# Patient Record
Sex: FEMALE | Race: WHITE | Employment: FULL TIME | URBAN - METROPOLITAN AREA
[De-identification: names, ages, dates, MRNs, and addresses within clinical notes are randomized per-mention and may not be internally consistent; named-entity substitution may affect disease eponyms.]

---

## 2017-05-24 ENCOUNTER — HOSPITAL ENCOUNTER (EMERGENCY)
Age: 27
Discharge: HOME | End: 2017-05-24
Attending: EMERGENCY MEDICINE

## 2017-05-24 ENCOUNTER — APPOINTMENT (OUTPATIENT)
Dept: CT IMAGING | Age: 27
End: 2017-05-24

## 2017-05-24 VITALS
WEIGHT: 145 LBS | TEMPERATURE: 99.1 F | SYSTOLIC BLOOD PRESSURE: 117 MMHG | BODY MASS INDEX: 24.75 KG/M2 | HEIGHT: 64 IN | OXYGEN SATURATION: 100 % | RESPIRATION RATE: 16 BRPM | HEART RATE: 100 BPM | DIASTOLIC BLOOD PRESSURE: 72 MMHG

## 2017-05-24 DIAGNOSIS — K59.00 CONSTIPATION, UNSPECIFIED CONSTIPATION TYPE: ICD-10-CM

## 2017-05-24 DIAGNOSIS — R10.11 RIGHT UPPER QUADRANT ABDOMINAL PAIN: Primary | ICD-10-CM

## 2017-05-24 LAB
-: ABNORMAL
ABSOLUTE EOS #: 0.12 K/UL (ref 0–0.4)
ABSOLUTE LYMPH #: 3.38 K/UL (ref 1–4.8)
ABSOLUTE MONO #: 0.65 K/UL (ref 0.1–1.2)
ALBUMIN SERPL-MCNC: 4.3 G/DL (ref 3.5–5.2)
ALBUMIN/GLOBULIN RATIO: 1.7 (ref 1–2.5)
ALP BLD-CCNC: 47 U/L (ref 35–104)
ALT SERPL-CCNC: 10 U/L (ref 5–33)
AMORPHOUS: ABNORMAL
ANION GAP SERPL CALCULATED.3IONS-SCNC: 15 MMOL/L (ref 9–17)
AST SERPL-CCNC: 16 U/L
BACTERIA: ABNORMAL
BASOPHILS # BLD: 0 %
BASOPHILS ABSOLUTE: 0.03 K/UL (ref 0–0.2)
BILIRUB SERPL-MCNC: 0.24 MG/DL (ref 0.3–1.2)
BILIRUBIN URINE: NEGATIVE
BUN BLDV-MCNC: 16 MG/DL (ref 6–20)
BUN/CREAT BLD: ABNORMAL (ref 9–20)
CALCIUM SERPL-MCNC: 9.3 MG/DL (ref 8.6–10.4)
CASTS UA: ABNORMAL /LPF
CHLORIDE BLD-SCNC: 102 MMOL/L (ref 98–107)
CO2: 24 MMOL/L (ref 20–31)
COLOR: YELLOW
COMMENT UA: ABNORMAL
CREAT SERPL-MCNC: 0.9 MG/DL (ref 0.5–0.9)
CRYSTALS, UA: ABNORMAL /HPF
DIFFERENTIAL TYPE: ABNORMAL
EOSINOPHILS RELATIVE PERCENT: 2 %
EPITHELIAL CELLS UA: ABNORMAL /HPF (ref 0–5)
GFR AFRICAN AMERICAN: >60 ML/MIN
GFR NON-AFRICAN AMERICAN: >60 ML/MIN
GFR SERPL CREATININE-BSD FRML MDRD: ABNORMAL ML/MIN/{1.73_M2}
GFR SERPL CREATININE-BSD FRML MDRD: ABNORMAL ML/MIN/{1.73_M2}
GLUCOSE BLD-MCNC: 77 MG/DL (ref 70–99)
GLUCOSE URINE: NEGATIVE
HCG(URINE) PREGNANCY TEST: NEGATIVE
HCT VFR BLD CALC: 36.9 % (ref 36–46)
HEMOGLOBIN: 12.3 G/DL (ref 12–16)
KETONES, URINE: NEGATIVE
LEUKOCYTE ESTERASE, URINE: NEGATIVE
LIPASE: 12 U/L (ref 13–60)
LYMPHOCYTES # BLD: 45 %
MCH RBC QN AUTO: 31.6 PG (ref 26–34)
MCHC RBC AUTO-ENTMCNC: 33.3 G/DL (ref 31–37)
MCV RBC AUTO: 94.9 FL (ref 80–100)
MONOCYTES # BLD: 9 %
MUCUS: ABNORMAL
NITRITE, URINE: NEGATIVE
OTHER OBSERVATIONS UA: ABNORMAL
PDW BLD-RTO: 12.5 % (ref 12.5–15.4)
PH UA: 7.5 (ref 5–8)
PLATELET # BLD: 193 K/UL (ref 140–450)
PLATELET ESTIMATE: ABNORMAL
PMV BLD AUTO: 10.1 FL (ref 8–14)
POTASSIUM SERPL-SCNC: 4.1 MMOL/L (ref 3.7–5.3)
PROTEIN UA: NEGATIVE
RBC # BLD: 3.89 M/UL (ref 4–5.2)
RBC # BLD: ABNORMAL 10*6/UL
RBC UA: ABNORMAL /HPF (ref 0–2)
RENAL EPITHELIAL, UA: ABNORMAL /HPF
SEG NEUTROPHILS: 44 %
SEGMENTED NEUTROPHILS ABSOLUTE COUNT: 3.35 K/UL (ref 1.8–7.7)
SODIUM BLD-SCNC: 141 MMOL/L (ref 135–144)
SPECIFIC GRAVITY UA: 1.01 (ref 1–1.03)
TOTAL PROTEIN: 6.9 G/DL (ref 6.4–8.3)
TRICHOMONAS: ABNORMAL
TURBIDITY: CLEAR
URINE HGB: ABNORMAL
UROBILINOGEN, URINE: NORMAL
WBC # BLD: 7.5 K/UL (ref 3.5–11)
WBC # BLD: ABNORMAL 10*3/UL
WBC UA: ABNORMAL /HPF (ref 0–5)
YEAST: ABNORMAL

## 2017-05-24 PROCEDURE — 6370000000 HC RX 637 (ALT 250 FOR IP): Performed by: EMERGENCY MEDICINE

## 2017-05-24 PROCEDURE — 84703 CHORIONIC GONADOTROPIN ASSAY: CPT

## 2017-05-24 PROCEDURE — 85025 COMPLETE CBC W/AUTO DIFF WBC: CPT

## 2017-05-24 PROCEDURE — 36415 COLL VENOUS BLD VENIPUNCTURE: CPT

## 2017-05-24 PROCEDURE — 83690 ASSAY OF LIPASE: CPT

## 2017-05-24 PROCEDURE — 96376 TX/PRO/DX INJ SAME DRUG ADON: CPT

## 2017-05-24 PROCEDURE — 6360000002 HC RX W HCPCS: Performed by: EMERGENCY MEDICINE

## 2017-05-24 PROCEDURE — 2580000003 HC RX 258: Performed by: EMERGENCY MEDICINE

## 2017-05-24 PROCEDURE — 96374 THER/PROPH/DIAG INJ IV PUSH: CPT

## 2017-05-24 PROCEDURE — 99284 EMERGENCY DEPT VISIT MOD MDM: CPT

## 2017-05-24 PROCEDURE — 6360000004 HC RX CONTRAST MEDICATION: Performed by: EMERGENCY MEDICINE

## 2017-05-24 PROCEDURE — 81001 URINALYSIS AUTO W/SCOPE: CPT

## 2017-05-24 PROCEDURE — 74177 CT ABD & PELVIS W/CONTRAST: CPT

## 2017-05-24 PROCEDURE — 80053 COMPREHEN METABOLIC PANEL: CPT

## 2017-05-24 RX ORDER — ARIPIPRAZOLE 2 MG/1
2 TABLET ORAL DAILY
COMMUNITY

## 2017-05-24 RX ORDER — SODIUM CHLORIDE 0.9 % (FLUSH) 0.9 %
10 SYRINGE (ML) INJECTION PRN
Status: DISCONTINUED | OUTPATIENT
Start: 2017-05-24 | End: 2017-05-24 | Stop reason: HOSPADM

## 2017-05-24 RX ORDER — NORETHINDRONE ACETATE AND ETHINYL ESTRADIOL 1.5-30(21)
1 KIT ORAL
COMMUNITY
Start: 2016-12-15 | End: 2018-10-16

## 2017-05-24 RX ORDER — 0.9 % SODIUM CHLORIDE 0.9 %
100 INTRAVENOUS SOLUTION INTRAVENOUS ONCE
Status: COMPLETED | OUTPATIENT
Start: 2017-05-24 | End: 2017-05-24

## 2017-05-24 RX ORDER — MORPHINE SULFATE 4 MG/ML
4 INJECTION, SOLUTION INTRAMUSCULAR; INTRAVENOUS ONCE
Status: COMPLETED | OUTPATIENT
Start: 2017-05-24 | End: 2017-05-24

## 2017-05-24 RX ORDER — MORPHINE SULFATE 2 MG/ML
2 INJECTION, SOLUTION INTRAMUSCULAR; INTRAVENOUS ONCE
Status: COMPLETED | OUTPATIENT
Start: 2017-05-24 | End: 2017-05-24

## 2017-05-24 RX ORDER — KETOROLAC TROMETHAMINE 30 MG/ML
30 INJECTION, SOLUTION INTRAMUSCULAR; INTRAVENOUS ONCE
Status: DISCONTINUED | OUTPATIENT
Start: 2017-05-24 | End: 2017-05-24 | Stop reason: HOSPADM

## 2017-05-24 RX ADMIN — MAGESIUM CITRATE 296 ML: 1.75 LIQUID ORAL at 03:10

## 2017-05-24 RX ADMIN — IOVERSOL 130 ML: 741 INJECTION INTRA-ARTERIAL; INTRAVENOUS at 02:21

## 2017-05-24 RX ADMIN — SODIUM CHLORIDE 100 ML: 9 INJECTION, SOLUTION INTRAVENOUS at 02:21

## 2017-05-24 RX ADMIN — Medication 10 ML: at 02:21

## 2017-05-24 RX ADMIN — MORPHINE SULFATE 4 MG: 4 INJECTION, SOLUTION INTRAMUSCULAR; INTRAVENOUS at 02:59

## 2017-05-24 RX ADMIN — MORPHINE SULFATE 2 MG: 2 INJECTION, SOLUTION INTRAMUSCULAR; INTRAVENOUS at 01:46

## 2017-05-24 ASSESSMENT — PAIN SCALES - GENERAL
PAINLEVEL_OUTOF10: 7
PAINLEVEL_OUTOF10: 7

## 2017-05-24 ASSESSMENT — ENCOUNTER SYMPTOMS
SHORTNESS OF BREATH: 0
ABDOMINAL PAIN: 1
DIARRHEA: 0
EYE PAIN: 0
BACK PAIN: 0
SORE THROAT: 0
NAUSEA: 0
CONSTIPATION: 0
WHEEZING: 0
COLOR CHANGE: 0
EYE DISCHARGE: 0
CHEST TIGHTNESS: 0
RHINORRHEA: 0
COUGH: 0
EYE REDNESS: 0

## 2017-05-24 ASSESSMENT — PAIN DESCRIPTION - DESCRIPTORS
DESCRIPTORS: SHARP;DULL;ACHING
DESCRIPTORS: DULL;ACHING

## 2017-05-24 ASSESSMENT — PAIN DESCRIPTION - LOCATION
LOCATION: ABDOMEN
LOCATION: ABDOMEN

## 2017-05-24 ASSESSMENT — PAIN DESCRIPTION - PAIN TYPE: TYPE: ACUTE PAIN

## 2017-05-24 ASSESSMENT — PAIN DESCRIPTION - PROGRESSION: CLINICAL_PROGRESSION: NOT CHANGED

## 2017-05-24 ASSESSMENT — PAIN DESCRIPTION - ORIENTATION
ORIENTATION: RIGHT;LOWER
ORIENTATION: RIGHT;LOWER

## 2017-06-09 ENCOUNTER — HOSPITAL ENCOUNTER (OUTPATIENT)
Age: 27
Discharge: HOME | End: 2017-06-09
Payer: COMMERCIAL

## 2017-06-10 ENCOUNTER — HOSPITAL ENCOUNTER (OUTPATIENT)
Age: 27
Setting detail: SPECIMEN
Discharge: HOME | End: 2017-06-10
Payer: COMMERCIAL

## 2017-06-10 PROCEDURE — 82533 TOTAL CORTISOL: CPT

## 2017-06-14 LAB — CORTISOL SALIVARY: 0.07 UG/DL

## 2018-10-16 ENCOUNTER — OFFICE VISIT (OUTPATIENT)
Dept: FAMILY MEDICINE CLINIC | Age: 28
End: 2018-10-16
Payer: COMMERCIAL

## 2018-10-16 VITALS
RESPIRATION RATE: 16 BRPM | DIASTOLIC BLOOD PRESSURE: 80 MMHG | HEIGHT: 64 IN | WEIGHT: 132 LBS | HEART RATE: 108 BPM | SYSTOLIC BLOOD PRESSURE: 112 MMHG | BODY MASS INDEX: 22.53 KG/M2

## 2018-10-16 DIAGNOSIS — R00.0 TACHYCARDIA: Primary | ICD-10-CM

## 2018-10-16 DIAGNOSIS — F41.9 ANXIETY: ICD-10-CM

## 2018-10-16 PROBLEM — E66.3 SEVERELY OVERWEIGHT: Status: ACTIVE | Noted: 2017-06-23

## 2018-10-16 PROBLEM — G93.32 CHRONIC FATIGUE SYNDROME: Status: ACTIVE | Noted: 2017-06-23

## 2018-10-16 PROBLEM — G47.09: Status: ACTIVE | Noted: 2017-06-23

## 2018-10-16 PROBLEM — N92.6 IRREGULAR MENSTRUAL CYCLE: Status: ACTIVE | Noted: 2017-06-23

## 2018-10-16 PROBLEM — K56.7 ILEUS (HCC): Status: ACTIVE | Noted: 2018-10-16

## 2018-10-16 PROCEDURE — 99204 OFFICE O/P NEW MOD 45 MIN: CPT | Performed by: FAMILY MEDICINE

## 2018-10-16 RX ORDER — NORETHINDRONE ACETATE AND ETHINYL ESTRADIOL AND FERROUS FUMARATE 1MG-20(21)
KIT ORAL
COMMUNITY
Start: 2018-09-23

## 2018-10-16 RX ORDER — CLONAZEPAM 1 MG/1
TABLET ORAL
COMMUNITY
Start: 2018-10-09 | End: 2018-10-24 | Stop reason: ALTCHOICE

## 2018-10-16 RX ORDER — CLONIDINE HYDROCHLORIDE 0.2 MG/1
TABLET ORAL
COMMUNITY
Start: 2018-10-07

## 2018-10-16 RX ORDER — ALPRAZOLAM 0.25 MG/1
TABLET ORAL
COMMUNITY
Start: 2018-10-15 | End: 2018-10-24 | Stop reason: ALTCHOICE

## 2018-10-16 ASSESSMENT — PATIENT HEALTH QUESTIONNAIRE - PHQ9
SUM OF ALL RESPONSES TO PHQ9 QUESTIONS 1 & 2: 0
2. FEELING DOWN, DEPRESSED OR HOPELESS: 0
SUM OF ALL RESPONSES TO PHQ QUESTIONS 1-9: 0
1. LITTLE INTEREST OR PLEASURE IN DOING THINGS: 0
SUM OF ALL RESPONSES TO PHQ QUESTIONS 1-9: 0

## 2018-10-16 NOTE — PROGRESS NOTES
Hematological: Negative. Negative for adenopathy. Does not bruise/bleed easily. Psychiatric/Behavioral: Negative for sleep disturbance, dysphoric mood and  decreased concentration. The patient is not nervous/anxious. Objective:     Physical Exam:     Nursing note and vitals reviewed. /80   Pulse 108   Resp 16   Ht 5' 4\" (1.626 m)   Wt 132 lb (59.9 kg)   Breastfeeding? No   BMI 22.66 kg/m²   Constitutional: She is oriented to person, place, and time. She   appears well-developed and well-nourished. HENT:   Head: Normocephalic and atraumatic. Right Ear: External ear normal. Tympanic membrane is not erythematous. No middle ear effusion. Left Ear: External ear normal. Tympanic membrane is not erythematous. No middle ear effusion. Nose: No mucosal edema. Mouth/Throat: Oropharynx is clear and moist. No posterior oropharyngeal erythema. Eyes: Conjunctivae and EOM are normal. Pupils are equal, round, and reactive to light. Neck: Normal range of motion. Neck supple. No thyromegaly present. Cardiovascular: Normal rate, regular rhythm and normal heart sounds. No murmur heard. Pulmonary/Chest: Effort normal and breath sounds normal. She has no wheezes. Shehas no rales. Abdominal: Soft. Bowel sounds are normal. She exhibits no distension and no mass. There is no tenderness. There is no rebound and no guarding. Genitourinary/Anorectal:deferred  Musculoskeletal: Normal range of motion. She exhibits no edema or tenderness. Lymphadenopathy: She has no cervical adenopathy. Neurological: She is alert and oriented to person, place, and time. She has normal reflexes. Skin: Skin is warm and dry. No rash noted. Psychiatric: She has a normal mood and affect. Her   behavior is normal. although appears anxious. Assessment:      1. Tachycardia    2. Anxiety          Plan:      Call or return to clinic prn if these symptoms worsen or fail to improve as anticipated.   I have reviewed the instructions with the patient, answering all questions to her satisfaction. No Follow-up on file. Orders Placed This Encounter   Procedures    CBC Auto Differential     Standing Status:   Future     Standing Expiration Date:   10/16/2019    Comprehensive Metabolic Panel     Standing Status:   Future     Standing Expiration Date:   10/16/2019    Holter Monitor 48 Hour     Order Specific Question:   Reason for Exam?     Answer: Tachycardia     No orders of the defined types were placed in this encounter.    discussed with patient she several Holter monitor done go off theQsymia for the test   Discussed  possibly increasing her Abilify with her psychiatrist to help with her anxiety disorder  Will notify her of her test results    Electronically signed by Marc Santos DO on 10/16/2018 at 9:56 AM

## 2018-10-21 ENCOUNTER — APPOINTMENT (OUTPATIENT)
Dept: CT IMAGING | Age: 28
End: 2018-10-21
Payer: COMMERCIAL

## 2018-10-21 ENCOUNTER — HOSPITAL ENCOUNTER (EMERGENCY)
Age: 28
Discharge: HOME | End: 2018-10-21
Attending: EMERGENCY MEDICINE
Payer: COMMERCIAL

## 2018-10-21 ENCOUNTER — APPOINTMENT (OUTPATIENT)
Dept: MRI IMAGING | Age: 28
End: 2018-10-21
Payer: COMMERCIAL

## 2018-10-21 VITALS
RESPIRATION RATE: 16 BRPM | OXYGEN SATURATION: 100 % | TEMPERATURE: 98.8 F | HEART RATE: 121 BPM | DIASTOLIC BLOOD PRESSURE: 79 MMHG | BODY MASS INDEX: 22.31 KG/M2 | SYSTOLIC BLOOD PRESSURE: 112 MMHG | WEIGHT: 130 LBS

## 2018-10-21 DIAGNOSIS — R56.9 NEW ONSET SEIZURE (HCC): Primary | ICD-10-CM

## 2018-10-21 LAB
-: ABNORMAL
ABSOLUTE EOS #: 0.04 K/UL (ref 0–0.44)
ABSOLUTE IMMATURE GRANULOCYTE: 0.04 K/UL (ref 0–0.3)
ABSOLUTE LYMPH #: 2.27 K/UL (ref 1.1–3.7)
ABSOLUTE MONO #: 0.72 K/UL (ref 0.1–1.2)
ALBUMIN SERPL-MCNC: 4.4 G/DL (ref 3.5–5.2)
ALBUMIN/GLOBULIN RATIO: 1.5 (ref 1–2.5)
ALP BLD-CCNC: 35 U/L (ref 35–104)
ALT SERPL-CCNC: 9 U/L (ref 5–33)
AMORPHOUS: ABNORMAL
AMPHETAMINE SCREEN URINE: NEGATIVE
ANION GAP SERPL CALCULATED.3IONS-SCNC: 16 MMOL/L (ref 9–17)
AST SERPL-CCNC: 17 U/L
BACTERIA: ABNORMAL
BARBITURATE SCREEN URINE: NEGATIVE
BASOPHILS # BLD: 1 % (ref 0–2)
BASOPHILS ABSOLUTE: 0.06 K/UL (ref 0–0.2)
BENZODIAZEPINE SCREEN, URINE: NEGATIVE
BILIRUB SERPL-MCNC: 0.19 MG/DL (ref 0.3–1.2)
BILIRUBIN DIRECT: <0.08 MG/DL
BILIRUBIN URINE: NEGATIVE
BILIRUBIN, INDIRECT: ABNORMAL MG/DL (ref 0–1)
BUN BLDV-MCNC: 12 MG/DL (ref 6–20)
BUN/CREAT BLD: ABNORMAL (ref 9–20)
BUPRENORPHINE URINE: NORMAL
CALCIUM SERPL-MCNC: 9 MG/DL (ref 8.6–10.4)
CANNABINOID SCREEN URINE: NEGATIVE
CASTS UA: ABNORMAL /LPF (ref 0–8)
CHLORIDE BLD-SCNC: 108 MMOL/L (ref 98–107)
CO2: 19 MMOL/L (ref 20–31)
COCAINE METABOLITE, URINE: NEGATIVE
COLOR: YELLOW
CREAT SERPL-MCNC: 1.04 MG/DL (ref 0.5–0.9)
CRYSTALS, UA: ABNORMAL /HPF
DIFFERENTIAL TYPE: ABNORMAL
EKG ATRIAL RATE: 117 BPM
EKG P AXIS: 49 DEGREES
EKG P-R INTERVAL: 142 MS
EKG Q-T INTERVAL: 316 MS
EKG QRS DURATION: 98 MS
EKG QTC CALCULATION (BAZETT): 440 MS
EKG R AXIS: 16 DEGREES
EKG T AXIS: 34 DEGREES
EKG VENTRICULAR RATE: 117 BPM
EOSINOPHILS RELATIVE PERCENT: 0 % (ref 1–4)
EPITHELIAL CELLS UA: ABNORMAL /HPF (ref 0–5)
GFR AFRICAN AMERICAN: >60 ML/MIN
GFR NON-AFRICAN AMERICAN: >60 ML/MIN
GFR SERPL CREATININE-BSD FRML MDRD: ABNORMAL ML/MIN/{1.73_M2}
GFR SERPL CREATININE-BSD FRML MDRD: ABNORMAL ML/MIN/{1.73_M2}
GLOBULIN: ABNORMAL G/DL (ref 1.5–3.8)
GLUCOSE BLD-MCNC: 86 MG/DL (ref 70–99)
GLUCOSE URINE: NEGATIVE
HCG QUALITATIVE: NEGATIVE
HCT VFR BLD CALC: 34.1 % (ref 36.3–47.1)
HEMOGLOBIN: 10.8 G/DL (ref 11.9–15.1)
IMMATURE GRANULOCYTES: 0 %
KETONES, URINE: NEGATIVE
LEUKOCYTE ESTERASE, URINE: NEGATIVE
LYMPHOCYTES # BLD: 22 % (ref 24–43)
MAGNESIUM: 2 MG/DL (ref 1.6–2.6)
MCH RBC QN AUTO: 28.4 PG (ref 25.2–33.5)
MCHC RBC AUTO-ENTMCNC: 31.7 G/DL (ref 28.4–34.8)
MCV RBC AUTO: 89.7 FL (ref 82.6–102.9)
MDMA URINE: NORMAL
METHADONE SCREEN, URINE: NEGATIVE
METHAMPHETAMINE, URINE: NORMAL
MONOCYTES # BLD: 7 % (ref 3–12)
MUCUS: ABNORMAL
NITRITE, URINE: NEGATIVE
NRBC AUTOMATED: 0 PER 100 WBC
OPIATES, URINE: NEGATIVE
OTHER OBSERVATIONS UA: ABNORMAL
OXYCODONE SCREEN URINE: NEGATIVE
PDW BLD-RTO: 13.2 % (ref 11.8–14.4)
PH UA: >9 (ref 5–8)
PHENCYCLIDINE, URINE: NEGATIVE
PLATELET # BLD: 318 K/UL (ref 138–453)
PLATELET ESTIMATE: ABNORMAL
PMV BLD AUTO: 10 FL (ref 8.1–13.5)
POTASSIUM SERPL-SCNC: 4 MMOL/L (ref 3.7–5.3)
PROPOXYPHENE, URINE: NORMAL
PROTEIN UA: ABNORMAL
RBC # BLD: 3.8 M/UL (ref 3.95–5.11)
RBC # BLD: ABNORMAL 10*6/UL
RBC UA: ABNORMAL /HPF (ref 0–4)
RENAL EPITHELIAL, UA: ABNORMAL /HPF
SEG NEUTROPHILS: 70 % (ref 36–65)
SEGMENTED NEUTROPHILS ABSOLUTE COUNT: 7.21 K/UL (ref 1.5–8.1)
SODIUM BLD-SCNC: 143 MMOL/L (ref 135–144)
SPECIFIC GRAVITY UA: 1.02 (ref 1–1.03)
TEST INFORMATION: NORMAL
TOTAL PROTEIN: 7.3 G/DL (ref 6.4–8.3)
TRICHOMONAS: ABNORMAL
TRICYCLIC ANTIDEPRESSANTS, UR: NORMAL
TURBIDITY: CLEAR
URINE HGB: NEGATIVE
UROBILINOGEN, URINE: NORMAL
WBC # BLD: 10.3 K/UL (ref 3.5–11.3)
WBC # BLD: ABNORMAL 10*3/UL
WBC UA: ABNORMAL /HPF (ref 0–5)
YEAST: ABNORMAL

## 2018-10-21 PROCEDURE — 84703 CHORIONIC GONADOTROPIN ASSAY: CPT

## 2018-10-21 PROCEDURE — 99255 IP/OBS CONSLTJ NEW/EST HI 80: CPT | Performed by: PSYCHIATRY & NEUROLOGY

## 2018-10-21 PROCEDURE — 6360000002 HC RX W HCPCS: Performed by: EMERGENCY MEDICINE

## 2018-10-21 PROCEDURE — A9579 GAD-BASE MR CONTRAST NOS,1ML: HCPCS | Performed by: STUDENT IN AN ORGANIZED HEALTH CARE EDUCATION/TRAINING PROGRAM

## 2018-10-21 PROCEDURE — 96374 THER/PROPH/DIAG INJ IV PUSH: CPT

## 2018-10-21 PROCEDURE — 99285 EMERGENCY DEPT VISIT HI MDM: CPT

## 2018-10-21 PROCEDURE — 80307 DRUG TEST PRSMV CHEM ANLYZR: CPT

## 2018-10-21 PROCEDURE — 93005 ELECTROCARDIOGRAM TRACING: CPT

## 2018-10-21 PROCEDURE — 83735 ASSAY OF MAGNESIUM: CPT

## 2018-10-21 PROCEDURE — 85025 COMPLETE CBC W/AUTO DIFF WBC: CPT

## 2018-10-21 PROCEDURE — 80048 BASIC METABOLIC PNL TOTAL CA: CPT

## 2018-10-21 PROCEDURE — 70450 CT HEAD/BRAIN W/O DYE: CPT

## 2018-10-21 PROCEDURE — 70553 MRI BRAIN STEM W/O & W/DYE: CPT

## 2018-10-21 PROCEDURE — 80076 HEPATIC FUNCTION PANEL: CPT

## 2018-10-21 PROCEDURE — 81001 URINALYSIS AUTO W/SCOPE: CPT

## 2018-10-21 PROCEDURE — 6360000004 HC RX CONTRAST MEDICATION: Performed by: STUDENT IN AN ORGANIZED HEALTH CARE EDUCATION/TRAINING PROGRAM

## 2018-10-21 RX ORDER — LORAZEPAM 2 MG/ML
1 INJECTION INTRAMUSCULAR ONCE
Status: COMPLETED | OUTPATIENT
Start: 2018-10-21 | End: 2018-10-21

## 2018-10-21 RX ORDER — SODIUM CHLORIDE 0.9 % (FLUSH) 0.9 %
10 SYRINGE (ML) INJECTION ONCE
Status: DISCONTINUED | OUTPATIENT
Start: 2018-10-21 | End: 2018-10-21 | Stop reason: HOSPADM

## 2018-10-21 RX ADMIN — GADOTERIDOL 10 ML: 279.3 INJECTION, SOLUTION INTRAVENOUS at 18:20

## 2018-10-21 RX ADMIN — LORAZEPAM 1 MG: 2 INJECTION INTRAMUSCULAR at 17:45

## 2018-10-21 ASSESSMENT — ENCOUNTER SYMPTOMS
RHINORRHEA: 0
COLOR CHANGE: 0
SHORTNESS OF BREATH: 0
COUGH: 0
ABDOMINAL PAIN: 0
SORE THROAT: 0
EYE PAIN: 0
NAUSEA: 0
VOMITING: 0

## 2018-10-21 ASSESSMENT — PAIN DESCRIPTION - DESCRIPTORS: DESCRIPTORS: ACHING

## 2018-10-21 ASSESSMENT — PAIN SCALES - GENERAL: PAINLEVEL_OUTOF10: 4

## 2018-10-21 ASSESSMENT — PAIN DESCRIPTION - LOCATION: LOCATION: HEAD

## 2018-10-21 ASSESSMENT — PAIN DESCRIPTION - PAIN TYPE: TYPE: ACUTE PAIN

## 2018-10-24 ENCOUNTER — OFFICE VISIT (OUTPATIENT)
Dept: NEUROLOGY | Age: 28
End: 2018-10-24
Payer: COMMERCIAL

## 2018-10-24 VITALS
HEART RATE: 96 BPM | HEIGHT: 64 IN | BODY MASS INDEX: 24.07 KG/M2 | WEIGHT: 141 LBS | DIASTOLIC BLOOD PRESSURE: 68 MMHG | SYSTOLIC BLOOD PRESSURE: 112 MMHG

## 2018-10-24 DIAGNOSIS — F41.9 ANXIETY: ICD-10-CM

## 2018-10-24 DIAGNOSIS — R00.0 TACHYCARDIA: ICD-10-CM

## 2018-10-24 DIAGNOSIS — F42.9 OBSESSIVE-COMPULSIVE DISORDER, UNSPECIFIED TYPE: ICD-10-CM

## 2018-10-24 DIAGNOSIS — R56.9 FIRST TIME SEIZURE (HCC): Primary | ICD-10-CM

## 2018-10-24 PROCEDURE — 99215 OFFICE O/P EST HI 40 MIN: CPT | Performed by: PSYCHIATRY & NEUROLOGY

## 2018-10-24 NOTE — PATIENT INSTRUCTIONS
1. EEG  2. Continue off wellbutrin  3. Make sure sleep enough, minimize stress, control OCD and anxiety. 4. Will not start any seizure medication  5.  Seizure precaution including no driving for 6 months     Return in 3 months     Migdalia Vidal MD, MS

## 2018-10-24 NOTE — PROGRESS NOTES
 Barbiturate Screen, Ur 10/21/2018 NEGATIVE  NEG Final    Comment:       (Positive cutoff 200 ng/mL)                  Benzodiazepine Screen, Urine 10/21/2018 NEGATIVE  NEG Final    Comment:       (Positive cutoff 200 ng/mL)                  Cocaine Metabolite, Urine 10/21/2018 NEGATIVE  NEG Final    Comment:       (Positive cutoff 300 ng/mL)                  Methadone Screen, Urine 10/21/2018 NEGATIVE  NEG Final    Comment:       (Positive cutoff 300 ng/mL)                  Opiates, Urine 10/21/2018 NEGATIVE  NEG Final    Comment:       (Positive cutoff 300 ng/mL)                  Phencyclidine, Urine 10/21/2018 NEGATIVE  NEG Final    Comment:       (Positive cutoff 25 ng/mL)                  Propoxyphene, Urine 10/21/2018 NOT REPORTED  NEG Final    Cannabinoid Scrn, Ur 10/21/2018 NEGATIVE  NEG Final    Comment:       (Positive cutoff 50 ng/mL)                  Oxycodone Screen, Ur 10/21/2018 NEGATIVE  NEG Final    Comment:       (Positive cutoff 100 ng/mL)                  Methamphetamine, Urine 10/21/2018 NOT REPORTED  NEG Final    Tricyclic Antidepressants, Urine 10/21/2018 NOT REPORTED  NEG Final    MDMA, Urine 10/21/2018 NOT REPORTED  NEG Final    Buprenorphine Urine 10/21/2018 NOT REPORTED  NEG Final    Test Information 10/21/2018 Assay provides medical screening only. The absence of expected drug(s) and/or   Final    Comment:  metabolite(s) may indicate diluted or adulterated urine, limitations of testing   or timing of collection. Testing for legal purposes should be confirmed by another method. To request   confirmation of test result, please call the lab within 7 days of sample   submission.          ALLERGIES:   Allergies   Allergen Reactions    Vancomycin Hives       MEDICATIONS:   Current Outpatient Prescriptions   Medication Sig Dispense Refill    cloNIDine (CATAPRES) 0.1 MG tablet       JUNEL FE 1/20 1-20 MG-MCG per tablet       Cholecalciferol (VITAMIN D PO) Take by mouth      Phentermine-Topiramate (QSYMIA) 15-92 MG CP24 Take by mouth. Gayl Neth ARIPiprazole (ABILIFY) 2 MG tablet Take 2 mg by mouth daily      BuPROPion HCl ER, XL, 450 MG TB24 Take 450 mg by mouth every morning        No current facility-administered medications for this visit. LABS & TESTS:      Lab Results   Component Value Date    WBC 10.3 10/21/2018    HGB 10.8 (L) 10/21/2018    HCT 34.1 (L) 10/21/2018    MCV 89.7 10/21/2018     10/21/2018         REVIEW OF SYSTEMS:      CONSTITUTIONAL Weight change: absent, Appetite change: absent, Fatigue: absent    HEENT Ears: normal, Visual disturbance: absent    RESPIRATORY Shortness of breath: absent, Cough: absent    CARDIOVASCULAR Chest pain: absent, Leg swelling :absent    GI Constipation: absent, Diarrhea: absent, Swallowing change: absent     Urinary frequency: absent, Urinary urgency: absent, Urinary incontinence: absent    MUSCULOSKELETAL Neck pain: absent, Back pain: absent, Stiffness: absent, Muscle pain: absent, Joint pain: absent Restless legs: absent    DERMATOLOGIC Hair loss: absent, Skin changes: absent    NEUROLOGIC Memory loss: absent, Confusion: absent, Seizures: present Trouble walking or imbalance: absent, Dizziness: absent, Weakness: absent, Numbness: absent Tremor: absent, Spasm: absent, Speech difficulty: absent, Headache: absent, Light sensitivity: absent    PSYCHIATRIC Anxiety: present, Hallucination: absent, Mood disorder: absent    HEMATOLOGIC Abnormal bleeding: absent, Anemia: absent, Clotting disorder: absent, Lymph gland changes: absent       VITALS  /68 (Site: Left Upper Arm, Position: Sitting)   Pulse 96   Ht 5' 4\" (1.626 m)   Wt 141 lb (64 kg)   BMI 24.20 kg/m²       PHYSICAL EXAMINATIONS:     General appearance: cooperative  Skin: no rash or skin lesions.   HEENT: normocephalic  Optic Fundi: deferred  Neck: supple, no cervcical adenopathy or carotid bruit  Lungs: clear to auscultation  Heart: Regular rate and rhythm, normal S1, S2. No murmurs, clicks or gallops. Peripheral pulses: radial pulses palpable  Abdominal: BS present, soft, NT, ND  Extremities: no edema    NEUROLOGICAL EXAMINATION:     MS: awake, alert and oriented. No aphasia, dysarthria, or neglect  CNs: PERRLA, EOMI, VF full, sensation intact, face symmetric, hearing intact, soft palate rises on phonation, sternocleidomastoid and trapezius intact. Tongue midline, no fasciculations. Motor: mild postural tremor in both hands, tone and bulk okay. RUE: delta 5/5, biceps 5/5, triceps 5/5,  5/5  LUE: delta 5/5, biceps 5/5, triceps 5/5,  5/5  RLE: hf 5/5, ke 5/5, df 5/5, pf 5/5  LLE: hf 5/5, ke 5/5, df 5/5, pf 5/5  Reflexes: 2+ throughout, symmetric, babinski not present. Coordination: FNF no dysmetria, heel to shin okay, MC okay, negative Rhomberg. Gait: Normal straight, able to do Tandem. Sensory: Normal to light touch/temp/pp/vibration, intact joint position sense, no extinction. ASSRSSMENT/PLANS:      // New onset seizure  - provoked or epileptic or others? - MRI brain w/wo normal  - EEG routine   - off wellbutrain  - seizure precaution including no driving for 6 months    // Episodes of tachycardia  - follow with PCP , per pt, Holter monitoring was planned  - need r/o convulsive syncope although no predrome    // Anxiety, OCD  - follow with psychiatrist    > 50% of 40 minute face to face time spent counseling patient. Told pt if EEG abnormal, will call her, otherwise, will review EEG on next visit. Pt voiced understanding and agreement.        RTC in 3 months    Earl Hubbard MD, MS

## 2018-10-26 ENCOUNTER — TELEPHONE (OUTPATIENT)
Dept: NEUROLOGY | Age: 28
End: 2018-10-26

## 2018-11-09 ENCOUNTER — HOSPITAL ENCOUNTER (OUTPATIENT)
Dept: NEUROLOGY | Age: 28
Discharge: HOME | End: 2018-11-09
Payer: COMMERCIAL

## 2018-11-09 DIAGNOSIS — R56.9 FIRST TIME SEIZURE (HCC): ICD-10-CM

## 2018-11-09 PROCEDURE — 95816 EEG AWAKE AND DROWSY: CPT

## 2018-11-09 PROCEDURE — 95819 EEG AWAKE AND ASLEEP: CPT | Performed by: PSYCHIATRY & NEUROLOGY

## 2018-11-12 NOTE — PROCEDURES
recommended.         [place]    D: 11/11/2018 14:22:19       T: 11/11/2018 22:51:32     [email or web address]  Job#: [id number]     Doc#: [id number]    CC:

## 2019-02-06 ENCOUNTER — OFFICE VISIT (OUTPATIENT)
Dept: NEUROLOGY | Age: 29
End: 2019-02-06

## 2019-02-06 VITALS
DIASTOLIC BLOOD PRESSURE: 84 MMHG | SYSTOLIC BLOOD PRESSURE: 122 MMHG | HEART RATE: 120 BPM | HEIGHT: 63 IN | WEIGHT: 145.8 LBS | BODY MASS INDEX: 25.83 KG/M2

## 2019-02-06 DIAGNOSIS — R56.9 NEW ONSET SEIZURE (HCC): Primary | ICD-10-CM

## 2019-02-06 PROCEDURE — 99214 OFFICE O/P EST MOD 30 MIN: CPT | Performed by: NURSE PRACTITIONER

## 2019-02-06 RX ORDER — FLUOXETINE HYDROCHLORIDE 40 MG/1
80 CAPSULE ORAL DAILY
Refills: 2 | COMMUNITY
Start: 2019-01-28

## 2019-02-06 RX ORDER — CLONAZEPAM 1 MG/1
1.5 TABLET ORAL DAILY
Refills: 2 | COMMUNITY
Start: 2019-02-01

## 2019-03-08 ENCOUNTER — HOSPITAL ENCOUNTER (OUTPATIENT)
Dept: CARDIAC CATH/INVASIVE PROCEDURES | Age: 29
Discharge: HOME | End: 2019-03-08

## 2019-03-08 PROCEDURE — 93660 TILT TABLE EVALUATION: CPT | Performed by: INTERNAL MEDICINE

## 2019-03-08 PROCEDURE — 84703 CHORIONIC GONADOTROPIN ASSAY: CPT

## 2019-03-11 LAB — HCG, PREGNANCY URINE (POC): NEGATIVE

## 2019-04-12 DIAGNOSIS — R56.9 NEW ONSET SEIZURE (HCC): ICD-10-CM

## 2019-07-29 ENCOUNTER — OFFICE VISIT (OUTPATIENT)
Dept: NEUROLOGY | Age: 29
End: 2019-07-29
Payer: COMMERCIAL

## 2019-07-29 VITALS
WEIGHT: 149.2 LBS | HEIGHT: 64 IN | SYSTOLIC BLOOD PRESSURE: 91 MMHG | DIASTOLIC BLOOD PRESSURE: 63 MMHG | BODY MASS INDEX: 25.47 KG/M2 | HEART RATE: 101 BPM

## 2019-07-29 DIAGNOSIS — G90.A POTS (POSTURAL ORTHOSTATIC TACHYCARDIA SYNDROME): ICD-10-CM

## 2019-07-29 DIAGNOSIS — H53.123 EPISODE OF VISUAL LOSS OF BOTH EYES: Primary | ICD-10-CM

## 2019-07-29 DIAGNOSIS — F42.9 OBSESSIVE-COMPULSIVE DISORDER, UNSPECIFIED TYPE: ICD-10-CM

## 2019-07-29 DIAGNOSIS — F41.9 ANXIETY: ICD-10-CM

## 2019-07-29 PROCEDURE — 99215 OFFICE O/P EST HI 40 MIN: CPT | Performed by: PSYCHIATRY & NEUROLOGY

## 2019-07-29 NOTE — PROGRESS NOTES
no rash or skin lesions. HEENT: normocephalic  Optic Fundi: deferred  Neck: supple, no cervcical adenopathy or carotid bruit  Lungs: clear to auscultation  Heart: Regular rate and rhythm, normal S1, S2. No murmurs, clicks or gallops. Peripheral pulses: radial pulses palpable  Abdominal: BS present, soft, NT, ND  Extremities: no edema    NEUROLOGICAL EXAMINATION:     MS: awake, alert and oriented. No aphasia, dysarthria, or neglect  CNs: PERRLA, EOMI, VF full, sensation intact, face symmetric, hearing intact, soft palate rises on phonation, sternocleidomastoid and trapezius intact. Tongue midline, no fasciculations. Motor: no abnormal movements, tone and bulk okay. RUE: delta 5/5, biceps 5/5, triceps 5/5,  5/5  LUE: delta 5/5, biceps 5/5, triceps 5/5,  5/5  RLE: hf 5/5, ke 5/5, df 5/5, pf 5/5  LLE: hf 5/5, ke 5/5, df 5/5, pf 5/5  Reflexes: 2+ throughout, symmetric, babinski not present. Coordination: FNF no dysmetria, heel to shin okay, MC okay, negative Rhomberg. Gait: Normal straight, able to do Tandem. Sensory: Normal to light touch/temp/pp/vibration, intact joint position sense, no extinction. ASSRSSMENT/PLANS:      // Episode of visual disturbance  - stereotyped, 3 times a month, also has had staring episodes  - LTME to capture and characterize typical events   - seizure precaution, may video tape if episodes occur at home. // POTS  - follow with cardiology     // Anxiety, OCD  - pt said she has been on prozac for years, there was report about prozac adverse reactions, 2% caused visual disturbance, not sure what kind if disturbance it may cause, whether it cause nystagmus not clear. may consider trial of twitching prozac to other medications. >50% of 40 minute  face to face time spent counseling patient, multiple issues discussed, all questions answered.      RTC after Kodak Arshad MD, MS

## 2019-08-05 ENCOUNTER — TELEPHONE (OUTPATIENT)
Dept: NEUROLOGY | Age: 29
End: 2019-08-05

## 2019-08-05 NOTE — TELEPHONE ENCOUNTER
Dr. Kirill Case ordered LTME monitoring for the patient at her last visit. Patient was called this morning to schedule this. Patient stated that she would look at her work schedule and call me back.

## 2020-10-03 ENCOUNTER — APPOINTMENT (OUTPATIENT)
Dept: GENERAL RADIOLOGY | Age: 30
End: 2020-10-03
Payer: COMMERCIAL

## 2020-10-03 ENCOUNTER — HOSPITAL ENCOUNTER (EMERGENCY)
Age: 30
Discharge: HOME | End: 2020-10-03
Attending: EMERGENCY MEDICINE
Payer: COMMERCIAL

## 2020-10-03 VITALS
RESPIRATION RATE: 12 BRPM | HEIGHT: 62 IN | SYSTOLIC BLOOD PRESSURE: 119 MMHG | BODY MASS INDEX: 29.26 KG/M2 | HEART RATE: 95 BPM | WEIGHT: 159 LBS | OXYGEN SATURATION: 98 % | TEMPERATURE: 98.2 F | DIASTOLIC BLOOD PRESSURE: 79 MMHG

## 2020-10-03 PROCEDURE — 96372 THER/PROPH/DIAG INJ SC/IM: CPT

## 2020-10-03 PROCEDURE — 73562 X-RAY EXAM OF KNEE 3: CPT

## 2020-10-03 PROCEDURE — 99283 EMERGENCY DEPT VISIT LOW MDM: CPT

## 2020-10-03 PROCEDURE — 6360000002 HC RX W HCPCS: Performed by: EMERGENCY MEDICINE

## 2020-10-03 RX ORDER — MORPHINE SULFATE 10 MG/ML
10 INJECTION, SOLUTION INTRAMUSCULAR; INTRAVENOUS ONCE
Status: COMPLETED | OUTPATIENT
Start: 2020-10-03 | End: 2020-10-03

## 2020-10-03 RX ORDER — PHENTERMINE AND TOPIRAMATE 15; 92 MG/1; MG/1
CAPSULE, EXTENDED RELEASE ORAL EVERY MORNING
COMMUNITY

## 2020-10-03 RX ORDER — HYDROCODONE BITARTRATE AND ACETAMINOPHEN 5; 325 MG/1; MG/1
1 TABLET ORAL EVERY 6 HOURS PRN
Qty: 20 TABLET | Refills: 0 | Status: SHIPPED | OUTPATIENT
Start: 2020-10-03 | End: 2020-10-06

## 2020-10-03 RX ORDER — FENTANYL CITRATE 50 UG/ML
50 INJECTION, SOLUTION INTRAMUSCULAR; INTRAVENOUS ONCE
Status: COMPLETED | OUTPATIENT
Start: 2020-10-03 | End: 2020-10-03

## 2020-10-03 RX ADMIN — FENTANYL CITRATE 50 MCG: 50 INJECTION, SOLUTION INTRAMUSCULAR; INTRAVENOUS at 11:05

## 2020-10-03 RX ADMIN — MORPHINE SULFATE 10 MG: 10 INJECTION INTRAVENOUS at 10:06

## 2020-10-03 ASSESSMENT — PAIN SCALES - GENERAL
PAINLEVEL_OUTOF10: 7
PAINLEVEL_OUTOF10: 8
PAINLEVEL_OUTOF10: 7

## 2020-10-03 NOTE — ED NOTES
Knee immobilizer to right as ordered. Fitted for crutches, crutch instruction with demonstration given, return demonstration received.      Malika Womack RN  10/03/20 [id number]

## 2020-10-03 NOTE — ED NOTES
Patient to ED with right knee injury. Was \"spotting\" a girl doing a gymnastic maneuver and patient's right knee was struck, states she heard/felt a \"crack\" and is now unable to bear weight on right leg. Patient applied ice and came directly to ED.      Calderon Barreto RN  10/03/20 1017

## 2020-10-03 NOTE — ED PROVIDER NOTES
[address]      Pt Name: Angella Vincent  MRN: [id number]  [employer] 1990  Date of evaluation: 10/3/2020      CHIEF COMPLAINT       Chief Complaint   Patient presents with    Knee Injury     right         HISTORY OF PRESENT ILLNESS      The patient presents with an injury to her right knee. This just occurred about 1/2-hour ago. She was spotting a gymnast, and the gymnast struck her in the knee. She felt a crack. She says now she is unable to bear weight. She is having pain in the proximal tibia. She denies numbness or tingling however. She denies pain elsewhere. Pain is worse with trying to bear weight or flex and extend. However, she is able to move her knee though with discomfort. REVIEW OF SYSTEMS       All systems reviewed and negative unless noted in HPI. The patient denies fever or constitutional symptoms. .    Denies any neck pain or stiffness. Right knee injury. Denies any weakness, numbness or focal neurologic deficit. History of seizures. Denies any skin rash or edema. No recent psychiatric issues. No easy bruising or bleeding. Denies any polyuria, polydypsia or history of immunocompromise. PAST MEDICAL HISTORY    has a past medical history of Abscess, Hashimoto's disease, and MRSA (methicillin resistant Staphylococcus aureus). SURGICAL HISTORY      has a past surgical history that includes Wrist surgery (Right); Tonsillectomy; other surgical history (Right, 12/11/2015); and other surgical history (Right, 12-11-15). CURRENT MEDICATIONS       Previous Medications    ARIPIPRAZOLE (ABILIFY) 2 MG TABLET    Take 2 mg by mouth daily    CHOLECALCIFEROL (VITAMIN D PO)    Take by mouth    CLONAZEPAM (KLONOPIN) 1 MG TABLET    Take 1.5 tablets by mouth daily. [facility] [place]     CLONIDINE (CATAPRES) 0.2 MG TABLET        FLUOXETINE (PROZAC) 40 MG CAPSULE    Take 1 capsule by mouth daily     JUNEL FE 1/20 1-20 MG-MCG PER TABLET LIRAGLUTIDE -WEIGHT MANAGEMENT (SAXENDA SC)    Inject 3 mg into the skin    PHENTERMINE-TOPIRAMATE (QSYMIA) 15-92 MG CP24    Take by mouth. ALLERGIES     is allergic to vancomycin. FAMILY HISTORY     She indicated that her mother is alive. She indicated that her father is alive. She indicated that the status of her paternal grandmother is unknown. She indicated that the status of her paternal grandfather is unknown.     family history includes Diabetes in her father and paternal grandfather; Heart Disease in her paternal grandmother. SOCIAL HISTORY      reports that she has never smoked. She has never used smokeless tobacco. She reports current alcohol use. She reports that she does not use drugs. PHYSICAL EXAM     INITIAL VITALS:  height is 5' 2\" (1.575 m) and weight is 72.1 kg (159 lb). Her oral temperature is 98.2 °F (36.8 °C). Her blood pressure is 119/79 and her pulse is 95. Her respiration is 12 and oxygen saturation is 98%. The patient is alert and oriented, in mild distress due to pain. HEENT is atraumatic. Pupils are PERRL at 4 mm. Mucous membranes moist.    Neck is supple. Heart sounds regular rate and rhythm with no gallops, murmurs, or rubs. Lungs clear, no wheezes, rales or rhonchi. Abdomen: soft, nontender. Musculoskeletal exam: Examination of right lower extremity demonstrates no pain in the hip or ankle. Some crepitance and pain is noted in the proximal tibia area. Patient can flex and extend. No obvious joint effusion is appreciated. Normal dorsalis pedis pulse. Normal sensory function of the toes. The remainder the musculoskeletal exam is unremarkable. Normal distal pulses in all extremities. Skin: no rash or edema. Neurological exam reveals cranial nerves 2 through 12 grossly intact. Patient has equal  and normal deep tendon reflexes. Psychiatric: no hallucinations or suicidal ideation. Lymphatics.:  No lymphadenopathy.          DIFFERENTIAL DIAGNOSIS/ MDM:     Tibial plateau fracture, internal derangement of the knee, knee dislocation    DIAGNOSTIC RESULTS       RADIOLOGY:   I reviewed the radiologist interpretations:  XR KNEE RIGHT (3 VIEWS)   Final Result   Addendum 1 of 1   ADDENDUM:   The patient has a nondisplaced medial tibial plateau fracture seen on the   obliques view, intra-articular. Addendum discussed with Dr. Christina Hennessy at 1054 hours. Final           XR KNEE RIGHT (3 VIEWS) (Edited Result - FINAL)   Result time 10/03/20 10:57:08   Addendum 1 of 1 by Blane Colbert MD (10/03/20 10:57:08)     ADDENDUM:  The patient has a nondisplaced medial tibial plateau fracture seen on the  obliques view, intra-articular.     Addendum discussed with Dr. Christina Hennessy at 1054 hours.               Final result by Blane Colbert MD (10/03/20 10:49:38)                 Impression:     Small suprapatellar effusion.  No acute osseous abnormality. Narrative:     EXAMINATION:   THREE XRAY VIEWS OF THE RIGHT KNEE     10/3/2020 10:15 am     COMPARISON:   None. HISTORY:   ORDERING SYSTEM PROVIDED HISTORY: trauma   TECHNOLOGIST PROVIDED HISTORY:   trauma   Reason for Exam: Pt states she is a  and was spotting a   person who then proceeded to fall on her.  Pain to medial side of rt knee. Acuity: Acute   Type of Exam: Initial   Mechanism of Injury: person fell on her     FINDINGS:   Mineralization and alignment are satisfactory.  Small suprapatellar effusion   is noted.  No acute fracture or dislocation is noted.                      EMERGENCY DEPARTMENT COURSE:   Vitals:    Vitals:    10/03/20 0958   BP: 119/79   Pulse: 95   Resp: 12   Temp: 98.2 °F (36.8 °C)   TempSrc: Oral   SpO2: 98%   Weight: 72.1 kg (159 lb)   Height: 5' 2\" (1.575 m)     -------------------------  BP: 119/79, Temp: 98.2 °F (36.8 °C), Pulse: 95, Resp: 12      Re-evaluation Notes    The patient was placed in a knee immobilizer with crutches.   She will be written for Norco for pain. She is to follow-up with the orthopedist.  I have given orthopedic referral.  The patient is discharged in good condition. CONSULTS:    Controlled Substance Monitoring:    Acute and Chronic Pain Monitoring:   RX Monitoring 5/24/2017   Attestation The Prescription Monitoring Report for this patient was reviewed today. Periodic Controlled Substance Monitoring Possible medication side effects, risk of tolerance and/or dependence, and alternative treatments discussed; No signs of potential drug abuse or diversion identified. [address] [place] [address] Dr buck. Radiologists indicates she sees the tibial plateau fx. Will correct reading. PROCEDURES:    The patient was placed in a knee immobilizer by the nurse. The patient had good color, sensation, and motion of the toes after placement. FINAL IMPRESSION      1. Closed fracture of medial portion of right tibial plateau, initial encounter          DISPOSITION/PLAN   DISPOSITION        Condition on Disposition    good    PATIENT REFERRED TO:  Lisa Alvarez DO  [id number] [address] [place] [address]  [phone]    In 2 days        DISCHARGE MEDICATIONS:  New Prescriptions    HYDROCODONE-ACETAMINOPHEN (NORCO) 5-325 MG PER TABLET    Take 1 tablet by mouth every 6 hours as needed for Pain for up to 3 days.        (Please note that portions of this note were completed with a voice recognition program.  Efforts were made to edit the dictations but occasionally words are mis-transcribed.)    Mayen MD   Attending Emergency Physician         Viktor Jackson MD  10/03/20 5920

## 2020-10-05 ENCOUNTER — TELEPHONE (OUTPATIENT)
Dept: ORTHOPEDIC SURGERY | Age: 30
End: 2020-10-05

## 2020-10-06 ENCOUNTER — HOSPITAL ENCOUNTER (OUTPATIENT)
Dept: CT IMAGING | Age: 30
Discharge: HOME | End: 2020-10-08
Payer: COMMERCIAL

## 2020-10-06 PROCEDURE — 73700 CT LOWER EXTREMITY W/O DYE: CPT

## 2020-10-08 ENCOUNTER — OFFICE VISIT (OUTPATIENT)
Dept: ORTHOPEDIC SURGERY | Age: 30
End: 2020-10-08
Payer: COMMERCIAL

## 2020-10-08 PROCEDURE — 99203 OFFICE O/P NEW LOW 30 MIN: CPT | Performed by: STUDENT IN AN ORGANIZED HEALTH CARE EDUCATION/TRAINING PROGRAM

## 2020-10-08 PROCEDURE — G8428 CUR MEDS NOT DOCUMENT: HCPCS | Performed by: STUDENT IN AN ORGANIZED HEALTH CARE EDUCATION/TRAINING PROGRAM

## 2020-10-08 PROCEDURE — G8484 FLU IMMUNIZE NO ADMIN: HCPCS | Performed by: STUDENT IN AN ORGANIZED HEALTH CARE EDUCATION/TRAINING PROGRAM

## 2020-10-08 PROCEDURE — G8419 CALC BMI OUT NRM PARAM NOF/U: HCPCS | Performed by: STUDENT IN AN ORGANIZED HEALTH CARE EDUCATION/TRAINING PROGRAM

## 2020-10-08 PROCEDURE — 1036F TOBACCO NON-USER: CPT | Performed by: STUDENT IN AN ORGANIZED HEALTH CARE EDUCATION/TRAINING PROGRAM

## 2020-10-08 RX ORDER — GABAPENTIN 100 MG/1
100 CAPSULE ORAL 3 TIMES DAILY PRN
Qty: 30 CAPSULE | Refills: 0 | Status: SHIPPED | OUTPATIENT
Start: 2020-10-08 | End: 2020-10-08

## 2020-10-08 RX ORDER — NAPROXEN 500 MG/1
500 TABLET ORAL 2 TIMES DAILY WITH MEALS
Qty: 28 TABLET | Refills: 0 | Status: SHIPPED | OUTPATIENT
Start: 2020-10-08 | End: 2020-12-08 | Stop reason: ALTCHOICE

## 2020-10-08 RX ORDER — ACETAMINOPHEN 500 MG
1000 TABLET ORAL EVERY 6 HOURS PRN
Qty: 112 TABLET | Refills: 0 | Status: SHIPPED | OUTPATIENT
Start: 2020-10-08 | End: 2020-12-08 | Stop reason: ALTCHOICE

## 2020-10-08 RX ORDER — CYCLOBENZAPRINE HCL 10 MG
10 TABLET ORAL 3 TIMES DAILY PRN
Qty: 30 TABLET | Refills: 0 | Status: SHIPPED | OUTPATIENT
Start: 2020-10-08 | End: 2020-10-22 | Stop reason: SDUPTHER

## 2020-10-08 RX ORDER — NAPROXEN 500 MG/1
500 TABLET ORAL 2 TIMES DAILY WITH MEALS
Qty: 28 TABLET | Refills: 0 | Status: SHIPPED | OUTPATIENT
Start: 2020-10-08 | End: 2020-10-08

## 2020-10-08 RX ORDER — OXYCODONE HYDROCHLORIDE 5 MG/1
5-10 TABLET ORAL EVERY 6 HOURS PRN
Qty: 28 TABLET | Refills: 0 | Status: SHIPPED | OUTPATIENT
Start: 2020-10-08 | End: 2020-10-08

## 2020-10-08 RX ORDER — GABAPENTIN 100 MG/1
100 CAPSULE ORAL 3 TIMES DAILY PRN
Qty: 30 CAPSULE | Refills: 0 | Status: SHIPPED | OUTPATIENT
Start: 2020-10-08 | End: 2020-11-10 | Stop reason: SDUPTHER

## 2020-10-08 RX ORDER — ACETAMINOPHEN 500 MG
1000 TABLET ORAL EVERY 6 HOURS PRN
Qty: 112 TABLET | Refills: 0 | Status: SHIPPED | OUTPATIENT
Start: 2020-10-08 | End: 2020-10-08

## 2020-10-08 RX ORDER — CYCLOBENZAPRINE HCL 10 MG
10 TABLET ORAL 3 TIMES DAILY PRN
Qty: 30 TABLET | Refills: 0 | Status: SHIPPED | OUTPATIENT
Start: 2020-10-08 | End: 2020-10-08

## 2020-10-08 RX ORDER — OXYCODONE HYDROCHLORIDE 5 MG/1
5-10 TABLET ORAL EVERY 6 HOURS PRN
Qty: 28 TABLET | Refills: 0 | Status: SHIPPED | OUTPATIENT
Start: 2020-10-08 | End: 2020-10-15 | Stop reason: SDUPTHER

## 2020-10-08 NOTE — PROGRESS NOTES
Refill    oxyCODONE (ROXICODONE) 5 MG immediate release tablet Take 1-2 tablets by mouth every 6 hours as needed for Pain for up to 7 days. Intended supply: 3 days. Take lowest dose possible to manage pain 28 tablet 0    acetaminophen (TYLENOL) 500 MG tablet Take 2 tablets by mouth every 6 hours as needed for Pain 112 tablet 0    cyclobenzaprine (FLEXERIL) 10 MG tablet Take 1 tablet by mouth 3 times daily as needed for Muscle spasms 30 tablet 0    naproxen (NAPROSYN) 500 MG tablet Take 1 tablet by mouth 2 times daily (with meals) 28 tablet 0    gabapentin (NEURONTIN) 100 MG capsule Take 1 capsule by mouth 3 times daily as needed (numbness, tingling, nerve pain) for up to 10 days. 30 capsule 0    Phentermine-Topiramate (QSYMIA) 15-92 MG CP24 Take by mouth.  Liraglutide -Weight Management (SAXENDA SC) Inject 3 mg into the skin      FLUoxetine (PROZAC) 40 MG capsule Take 1 capsule by mouth daily   2    clonazePAM (KLONOPIN) 1 MG tablet Take 1.5 tablets by mouth daily. .  2    cloNIDine (CATAPRES) 0.2 MG tablet       JUNEL FE 1/20 1-20 MG-MCG per tablet       Cholecalciferol (VITAMIN D PO) Take by mouth      ARIPiprazole (ABILIFY) 2 MG tablet Take 2 mg by mouth daily       No current facility-administered medications for this visit. Allergies:    Norco [hydrocodone-acetaminophen] and Vancomycin    Social History:   Social History     Socioeconomic History    Marital status: Single     Spouse name: Not on file    Number of children: Not on file    Years of education: Not on file    Highest education level: Not on file   Occupational History    Not on file   Social Needs    Financial resource strain: Not on file    Food insecurity     Worry: Not on file     Inability: Not on file    Transportation needs     Medical: Not on file     Non-medical: Not on file   Tobacco Use    Smoking status: Never Smoker    Smokeless tobacco: Never Used   Substance and Sexual Activity    Alcohol use:  Yes stable posteromedial/lateral fragment    ASSESSMENT:  30 y.o. female with right medial tibial plateau fracture    PLAN:      - Lengthy discussion held with patient regarding her injury and the treatment options. This is not the classic presentation of a medial plateau fracture with a completely different mechanism, being more of a hyperextension injury rather that an axial load with dislocation. For that reason along with the minimally displaced nature we feel non-operative treatment is appropriate for the injury at this given time. - We discussed in detail the importance that she stay compliant with given restrictions to set herself up for the best possible outcome. She expressed understanding, but is still hesitant in her ability to remain compliant. We warned her of the risks of weight bearing too soon and she acknowledged those risks  - Multi-modal pain protocol prescribed  - Acetaminophen 1000mg q6h   - naproxen 500mg q12h   - Gabapentin 100mg TID prn   - Cyclobenzaprine 10mg q8h prn   - Oxycodone 5-10mg q6h prn pain   - Non weight bearing to the right lower extremity  - Hinged knee brace provided, wear full time, okay to remove for hygiene and take breaks. Sleep with brace on. 30-90 degrees okay   - Follow up 1 week for XR right knee    Return in about 1 week (around 10/15/2020). Orders Placed This Encounter   Medications    DISCONTD: cyclobenzaprine (FLEXERIL) 10 MG tablet     Sig: Take 1 tablet by mouth 3 times daily as needed for Muscle spasms     Dispense:  30 tablet     Refill:  0    DISCONTD: gabapentin (NEURONTIN) 100 MG capsule     Sig: Take 1 capsule by mouth 3 times daily as needed (numbness, tingling, nerve pain) for up to 10 days.      Dispense:  30 capsule     Refill:  0    DISCONTD: acetaminophen (TYLENOL) 500 MG tablet     Sig: Take 2 tablets by mouth every 6 hours as needed for Pain     Dispense:  112 tablet     Refill:  0    DISCONTD: oxyCODONE (ROXICODONE) 5 MG immediate release tablet     Sig: Take 1-2 tablets by mouth every 6 hours as needed for Pain for up to 7 days. Intended supply: 3 days. Take lowest dose possible to manage pain     Dispense:  28 tablet     Refill:  0     Reduce doses taken as pain becomes manageable    DISCONTD: naproxen (NAPROSYN) 500 MG tablet     Sig: Take 1 tablet by mouth 2 times daily (with meals)     Dispense:  28 tablet     Refill:  0    oxyCODONE (ROXICODONE) 5 MG immediate release tablet     Sig: Take 1-2 tablets by mouth every 6 hours as needed for Pain for up to 7 days. Intended supply: 3 days. Take lowest dose possible to manage pain     Dispense:  28 tablet     Refill:  0     Reduce doses taken as pain becomes manageable    acetaminophen (TYLENOL) 500 MG tablet     Sig: Take 2 tablets by mouth every 6 hours as needed for Pain     Dispense:  112 tablet     Refill:  0    cyclobenzaprine (FLEXERIL) 10 MG tablet     Sig: Take 1 tablet by mouth 3 times daily as needed for Muscle spasms     Dispense:  30 tablet     Refill:  0    naproxen (NAPROSYN) 500 MG tablet     Sig: Take 1 tablet by mouth 2 times daily (with meals)     Dispense:  28 tablet     Refill:  0    gabapentin (NEURONTIN) 100 MG capsule     Sig: Take 1 capsule by mouth 3 times daily as needed (numbness, tingling, nerve pain) for up to 10 days. Dispense:  30 capsule     Refill:  0       No orders of the defined types were placed in this encounter.        Electronically signed by Heena Garcia DO on10/8/2020 at 11:16 AM

## 2020-10-09 ENCOUNTER — TELEPHONE (OUTPATIENT)
Dept: ORTHOPEDIC SURGERY | Age: 30
End: 2020-10-09

## 2020-10-15 ENCOUNTER — OFFICE VISIT (OUTPATIENT)
Dept: ORTHOPEDIC SURGERY | Age: 30
End: 2020-10-15
Payer: COMMERCIAL

## 2020-10-15 PROCEDURE — G8484 FLU IMMUNIZE NO ADMIN: HCPCS | Performed by: STUDENT IN AN ORGANIZED HEALTH CARE EDUCATION/TRAINING PROGRAM

## 2020-10-15 PROCEDURE — G8427 DOCREV CUR MEDS BY ELIG CLIN: HCPCS | Performed by: STUDENT IN AN ORGANIZED HEALTH CARE EDUCATION/TRAINING PROGRAM

## 2020-10-15 PROCEDURE — 99213 OFFICE O/P EST LOW 20 MIN: CPT | Performed by: STUDENT IN AN ORGANIZED HEALTH CARE EDUCATION/TRAINING PROGRAM

## 2020-10-15 PROCEDURE — 1036F TOBACCO NON-USER: CPT | Performed by: STUDENT IN AN ORGANIZED HEALTH CARE EDUCATION/TRAINING PROGRAM

## 2020-10-15 PROCEDURE — G8419 CALC BMI OUT NRM PARAM NOF/U: HCPCS | Performed by: STUDENT IN AN ORGANIZED HEALTH CARE EDUCATION/TRAINING PROGRAM

## 2020-10-15 RX ORDER — OXYCODONE HYDROCHLORIDE 5 MG/1
5-10 TABLET ORAL EVERY 6 HOURS PRN
Qty: 28 TABLET | Refills: 0 | Status: SHIPPED | OUTPATIENT
Start: 2020-10-15 | End: 2020-10-22

## 2020-10-15 NOTE — PROGRESS NOTES
[facility]  [facility]  [address] [place] [address]  Dept: [phone]  Dept Fax: [phone]        [facility] Follow Up      Subjective:   Date of Surgery: 10/3/2020    Maryam Manjarrez is a 30y.o. year old female who presents to the clinic today for routine follow up rhgt knee injury sustained on 10/3/2020 there for approximately 2 weeks out of date of injury. Patient sustained injury to her right knee when she was spotting a gymnast, and a gymnast fell down and landed on her knee. Patient states she felt immediate crack, and pain and was unable to bear weight. She was seen at [facility] ED on 10/3/2020 where x-rays demonstrated that patient sustained a closed fracture of the medial tibial plateau. At this visit, patient complains of continued knee pain, and complains of experiencing some locking and catching symptoms. She also admits to weightbearing occasionally on the right knee, despite being told to be nonweightbearing. Patient was placed in a knee immobilizer and told to follow-up in outpatient orthopedics. Review of Systems  Gen: no fever, chills, malaise  CV: no chest pain or palpitations  Resp: no cough or shortness of breath  GI: no nausea, vomiting, diarrhea, or constipation  Neuro: no seizures, vertigo, or headache  Msk: Right knee pain  10 remaining systems reviewed and negative    Objective : There were no vitals filed for this visit. There is no height or weight on file to calculate BMI. General: No acute distress, resting comfortably in the clinic  Neuro: alert. oriented  Eyes: Extra-ocular muscles intact  Pulm: Respirations unlabored and regular. Skin: warm, well perfused  Psych:   Patient has good fund of knowledge and displays understanding of exam, diagnosis, and plan. MSK:    RLE: Knee hinged brace on, okfor 30 to 90 degrees. Minimal effusion and soft tissue swelling surrounding the knee. There is TTP at the anterior medial knee. Skin is intact, there is no open wounds, abrasions, or ecchymoses. Compartments soft. 2+ DP pulse. TA/EHL/FHL/GS motor intact. Deep and Superficial Peroneal/Saphenous/Sural SILT. Radiology:  History: Right knee injury     Comparison: 10/3/2020    Findings: AP, oblique and lateral views of the right knee in a skeletally mature individual demonstrating a minimally displaced fracture of the medial plateau. There has been no significant displacement since prior radiographs. There are no other fractures, dislocations, subluxations or radiopaque foreign bodies. Impression: Right knee minimally displaced medial plateau fracture. Assessment:   30y.o. year old female with right knee medial plateau fracture   Plan:       -Thorough discussion regarding the etiology and prognosis of this patients injuries today as well as her recovery process  -Patient admits to weigh bear occasionally on her right foot and states she has been experiencing mechanical symptoms such as locking, catching.   -She reports some worsening symptoms of pain  -Thorough discussion with the patient regarding the possibility that she may have injured some of her ligaments and possibly menisci during this injury. Therefore we may be able to better visualize and assess this with an MRI at this time, doing a physical exam ligamentous test of the knee would be too painful for her. -MRI to assess for ligamentous injuries   -Follow up for MRI results in 1 week     Follow up:No follow-ups on file. Orders Placed This Encounter   Procedures    MRI KNEE RIGHT WO CONTRAST       Electronically signed by Nicholas Caballero DO on 10/15/2020 at 9:10 AM    This note is created with the assistance of a speech recognition program.  While intending to generate a document that actually reflects the content of the visit, the document can still have some errors including those of syntax and sound a like substitutions which may escape proof reading.   In such instances, actual meaning can be extrapolated by contextual diversion

## 2020-10-21 NOTE — TELEPHONE ENCOUNTER
Patient is asking for a refill on her muscle relaxer she was given at last visit. She did not know name or dosage    [facility] pharmacy.

## 2020-10-22 RX ORDER — CYCLOBENZAPRINE HCL 10 MG
10 TABLET ORAL 3 TIMES DAILY PRN
Qty: 30 TABLET | Refills: 0 | Status: SHIPPED | OUTPATIENT
Start: 2020-10-22 | End: 2020-11-01

## 2020-10-27 ENCOUNTER — HOSPITAL ENCOUNTER (OUTPATIENT)
Dept: MRI IMAGING | Age: 30
Discharge: HOME | End: 2020-10-29
Payer: COMMERCIAL

## 2020-10-27 PROCEDURE — 73721 MRI JNT OF LWR EXTRE W/O DYE: CPT

## 2020-11-06 NOTE — CONSULTS
NEUROLOGY INPATIENT CONSULT NOTE    10/21/2018         Randy Oh is a  33 y.o. female admitted on 10/21/2018 with  No admission diagnoses are documented for this encounter. History is obtained mostly from the patient and the medical record and from the caregivers. Chart is reviewed and patient is examined. Briefly, this is a  33 y.o. female admitted on 10/21/2018 with witnessed seizure like activity. 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 She began exhibit  generalize jerking movements that the  mom described as \"tonic-clonic\" in character. The patient then began fall towards the ground, stiffen up and continued to have generalized tonic-clonic jerking. The mother stated that the event lasted for 43 seconds. EMS was called to the scene. Upon their arrival ,the  patient was confused and could not to recall her age and name. The confusion lasted  for  three minutes. The mother denied tongue  biting, foaming at the mouth,  eye rolling, and loss of bladder/bowel incontinence. The patient  was brought to [facility] emergency department  for further evaluation. In the ED,   the patient was  oriented x4 and back to her baseline. On examination,  the patient had no focal neurologic deficits. Upon further  questioning, the patient denied  lightheadedness, lower extremity muscle weakness, chest pain, visual  changes, and  aura prior to the event . Neurology was consulted for the evaluation of first time seizure activity. Of note, the patient Wellbutrin for anxiety. She has been taking this medication for the past 2 years and denies any recent change in dosage. The patient was also tachycardic  with heart rate in the 120s. No current facility-administered medications on file prior to encounter.       Current Outpatient Prescriptions on File Prior to Encounter   Medication Sig Dispense Refill    FUNCTION:  Heel to Shin:     Right side:  normal                             Left side:  normal    Finger to Nose:   Right:  normal                              Left:  normal            REFLEX FUNCTION:  Symmetric, no perverted reflex,      Right Bicep:  2+  Left Bicep:  2+  Right Knee:  2+  Left Knee:  2+    Babinski sign   Right side:Downgoing                          Left side   :Downgoing   STATION and GAIT  Not testing in the setting of recent seizure            Data:    Lab Results:     Lab Results   Component Value Date    ALT 9 10/21/2018    AST 17 10/21/2018     Hematology:  Recent Labs      10/21/18   1607   WBC  10.3   HGB  10.8*   HCT  34.1*   PLT  318     Chemistry:  Recent Labs      10/21/18   1607   NA  143   K  4.0   CL  108*   CO2  19*   GLUCOSE  86   BUN  12   CREATININE  1.04*   MG  2.0   CALCIUM  9.0     Recent Labs      10/21/18   1607   PROT  7.3   LABALBU  4.4   AST  17   ALT  9       No results found for: PHENYTOIN, PHENYTOIN, VALPROATE, CBMZ        Diagnostic data reviewed:      Impression and Plan:     Ms. Desmond Vargas is a 33 y.o. female with Loss of consciousness followed by generalized tonic-clonic activity lasting for almost 1 minute. The event the patient was confused for 3 minutes and able to recall her name or recall her correct age questioned by EMS. The patient arrived to the emergency room alert and awake oriented ×4 and nonfocal on neurologic examination. She currently has a medical history of anxiety and has been taking Wellbutrin for the past 2 years on. She denies any recent changes in her Wellbutrin dosage. The seizure events may most likely be attributed to the Wellbutrin but etiologies can not be excluded. First time seizure event followed by a  brief postictal stage.       Plan:   -Obtain MRI brain with and without contrast to rule out any structural etiology causing seizures,  MRI brain is   unremarkable patient is to be discharged home  -Patient and family at Negative

## 2020-11-10 ENCOUNTER — OFFICE VISIT (OUTPATIENT)
Dept: ORTHOPEDIC SURGERY | Age: 30
End: 2020-11-10
Payer: COMMERCIAL

## 2020-11-10 PROCEDURE — G8419 CALC BMI OUT NRM PARAM NOF/U: HCPCS | Performed by: ORTHOPAEDIC SURGERY

## 2020-11-10 PROCEDURE — 1036F TOBACCO NON-USER: CPT | Performed by: ORTHOPAEDIC SURGERY

## 2020-11-10 PROCEDURE — 99213 OFFICE O/P EST LOW 20 MIN: CPT | Performed by: ORTHOPAEDIC SURGERY

## 2020-11-10 PROCEDURE — G8484 FLU IMMUNIZE NO ADMIN: HCPCS | Performed by: ORTHOPAEDIC SURGERY

## 2020-11-10 PROCEDURE — G8427 DOCREV CUR MEDS BY ELIG CLIN: HCPCS | Performed by: ORTHOPAEDIC SURGERY

## 2020-11-10 RX ORDER — TRAMADOL HYDROCHLORIDE 50 MG/1
50 TABLET ORAL EVERY 6 HOURS PRN
Qty: 30 TABLET | Refills: 0 | Status: SHIPPED | OUTPATIENT
Start: 2020-11-10 | End: 2020-11-18

## 2020-11-10 RX ORDER — CYCLOBENZAPRINE HCL 10 MG
10 TABLET ORAL EVERY 6 HOURS PRN
Qty: 50 TABLET | Refills: 0 | Status: SHIPPED | OUTPATIENT
Start: 2020-11-10

## 2020-11-10 RX ORDER — GABAPENTIN 100 MG/1
100 CAPSULE ORAL 3 TIMES DAILY PRN
Qty: 30 CAPSULE | Refills: 0 | Status: SHIPPED | OUTPATIENT
Start: 2020-11-10 | End: 2020-12-08

## 2020-11-10 NOTE — PROGRESS NOTES
plateau. Skin is intact. No gross motor or sensory deficits on exam. Compartments soft. DP/PT pulses 2+. Radiology:  Right knee XR:  History: Right medial tibial plateau fracture    Comparison: 10/15/2020, 10/3/2020    Findings: 3 views of the right knee showing a right medial tibial plateau fracture with progressive interval healing since prior radiographs. There is no appreciable displacement of the fracture site. Visualized callus formation medially. No depression noted. Less than 2mm articular fracture gap. Impression: Progressive interval healing of a minimally displaced right medial tibial plateau fracture. Right knee MRI:  Impression    No change in alignment to now subacute fracture of the proximal tibia with    associated bone marrow edema as above.         Mild bone marrow edema without fracture lines likely reflecting bone marrow    contusions involving the anteromedial and anterolateral aspects of the medial    and lateral femoral condyles respectively.         Minimal edema about the MCL.  MCL itself appears intact.  Findings could be    reactive or reflect low-grade sprain of the MCL.         Small knee joint effusion. Assessment:   30y.o. year old female with minimally displaced right medial tibial plateau fracture treated non-operatively. Plan:      Patient returns to the office today to review the results of her MRI. There is no obvious soft tissue injury visualized on MRI including menisci and cruciates. She is maintaining her HKB well. She was further instructed to maintain strict NWB restrictions for the right knee, which she verbalized understanding. Continue crutches for assistance with ambulation. She was provided a refill on her medications. We will see her in 6 weeks for follow up. At that time she will be over 10 weeks out from injury, and if healing well, will likely begin progressive weightbearing at that time.     Follow up:Return in about 6 weeks (around 12/22/2020) for X-rays out of cast/splint/brace/sling. Orders Placed This Encounter   Medications    gabapentin (NEURONTIN) 100 MG capsule     Sig: Take 1 capsule by mouth 3 times daily as needed (numbness, tingling, nerve pain) for up to 10 days. Dispense:  30 capsule     Refill:  0    traMADol (ULTRAM) 50 MG tablet     Sig: Take 1 tablet by mouth every 6 hours as needed for Pain for up to 8 days.      Dispense:  30 tablet     Refill:  0     Reduce doses taken as pain becomes manageable    cyclobenzaprine (FLEXERIL) 10 MG tablet     Sig: Take 1 tablet by mouth every 6 hours as needed for Muscle spasms     Dispense:  50 tablet     Refill:  0          Orders Placed This Encounter   Procedures    XR KNEE RIGHT (3 VIEWS)     Order Specific Question:   Reason for exam:     Answer:   ap lateral oblique       Electronically signed by Delbert Joy DO on 11/15/2020 at 3:03 PM

## 2020-12-08 ENCOUNTER — TELEPHONE (OUTPATIENT)
Dept: NEUROLOGY | Age: 30
End: 2020-12-08

## 2020-12-08 ENCOUNTER — OFFICE VISIT (OUTPATIENT)
Dept: NEUROLOGY | Age: 30
End: 2020-12-08
Payer: COMMERCIAL

## 2020-12-08 VITALS
HEART RATE: 108 BPM | BODY MASS INDEX: 30.79 KG/M2 | SYSTOLIC BLOOD PRESSURE: 112 MMHG | WEIGHT: 173.8 LBS | HEIGHT: 63 IN | TEMPERATURE: 96.8 F | DIASTOLIC BLOOD PRESSURE: 75 MMHG

## 2020-12-08 PROBLEM — G90.A POTS (POSTURAL ORTHOSTATIC TACHYCARDIA SYNDROME): Status: ACTIVE | Noted: 2020-12-08

## 2020-12-08 PROCEDURE — G8417 CALC BMI ABV UP PARAM F/U: HCPCS | Performed by: NURSE PRACTITIONER

## 2020-12-08 PROCEDURE — 1036F TOBACCO NON-USER: CPT | Performed by: NURSE PRACTITIONER

## 2020-12-08 PROCEDURE — 99214 OFFICE O/P EST MOD 30 MIN: CPT | Performed by: NURSE PRACTITIONER

## 2020-12-08 PROCEDURE — G8427 DOCREV CUR MEDS BY ELIG CLIN: HCPCS | Performed by: NURSE PRACTITIONER

## 2020-12-08 PROCEDURE — G8484 FLU IMMUNIZE NO ADMIN: HCPCS | Performed by: NURSE PRACTITIONER

## 2020-12-08 RX ORDER — ARIPIPRAZOLE 5 MG/1
5 TABLET ORAL DAILY
COMMUNITY

## 2020-12-08 NOTE — PROGRESS NOTES
[facility]            AnthCharlie norie[address]eth [address]          [place], [address] [facility]          Dept: [phone]          Dept Fax: [phone]        MD Mila Muro MD Ahmed B. Ruth Baller, MD Ofelia Niece, MD Lyndee Gemma, MD Wells Hermanns, CNP            12/8/2020      HISTORY OF PRESENT ILLNESS:       I had the pleasure of seeing Xavi Owens, who returns for continuing neurologic care. The patient is a 35year old female who was last seen on  July 29, 2019 by Dr. Harpreet Gutierrez for management of seizures. In February 2019 she described her seizure in October 2018 as she was at a friends bridal shower when her limbs became stiff and her whole body started shaking for 43 seconds. There was no tongue biting or urinary continence. There was mild post ictal headache and confusion only for a few moments. There is no family history of seizures, no head injury as a child, patient had been on Wellbutrin prior to the incident, however stopped taking it after her emergency department visit on October 21, 2018. The patient had an outpatient EEG done on November 9, 2018 which was normal.     Patient previously provided additional information of another event which occurred 3 years ago. The patient had been walking her dog and the next thing she remembered is that she woke up on the ground near the barn and her father was trying to wake her up. She was disoriented and had no recollection of how she had gotten to the ground. She did go to the emergency department at that time and was discharged without additional follow-up. It was presumed that she had passed out, but had no prodromal symptoms nor feeling pale or diaphoretic upon awakening. Instead, she was confused for at least 15 minutes following the event.     At her last visit with Dr. Harpreet Gutierrez she reported that sometimes after after her eye episodes she will feel slow in her mind and feeling tired. She had tilt table test which revealed that she has postural orthostatic tachycardia with neurocardiogenic syncope (POTS) and a referral was made to cardiology. Patient was ordered an LTME and EEG previously but has yet to complete them. She also reported that she has had at least 3-5 episodes per month where her eyes will twitch for approximately 20 seconds. She cannot provide me with information on what precipitates the twitching. She does wear contact lenses and keeps her prescription up to date. Today she reports that she is considering restarting wellbutrin as it has been successful in past. She is currently taking prozac but notes that it is not as effective in managing her obsessive compulsive disorder. She reports that her episode in 2018 was her last seizure type activity. She denies any feelings of lightheadedness or dizziness, notes she has not had a syncopal episdoe since 2018. She was previously on wellbutrin 450 mg and she will follow with psychiatry to restart this medication.                Prior testing reviewed:    MRI brain w/wo 10/21/2018  Normal     CTH 10/21/2018  Normal      Other tests in 10/2018  UDS negative  EKG sinus tachy           PAST MEDICAL HISTORY:         Diagnosis Date    Abscess     axilla    Hashimoto's disease     MRSA (methicillin resistant Staphylococcus aureus) 2008    h/o         PAST SURGICAL HISTORY:         Procedure Laterality Date    OTHER SURGICAL HISTORY Right 12/11/2015    excision groin mass    OTHER SURGICAL HISTORY Right 12-11-15    excisional right groin mass    TONSILLECTOMY      WRIST SURGERY Right     ORIF        SOCIAL HISTORY:     Social History     Socioeconomic History    Marital status: Single     Spouse name: Not on file    Number of children: Not on file    Years of education: Not on file    Highest education level: Not on file   Occupational History    Not on file   Social Needs  Financial resource strain: Not on file   Tahir-Raquel insecurity     Worry: Not on file     Inability: Not on file   [employer] needs     Medical: Not on file     Non-medical: Not on file   Tobacco Use    Smoking status: Never Smoker    Smokeless tobacco: Never Used   Substance and Sexual Activity    Alcohol use: Yes     Comment: Socially-Couple drinks weekly.  Drug use: No    Sexual activity: Yes     Partners: Male   Lifestyle    Physical activity     Days per week: Not on file     Minutes per session: Not on file    Stress: Not on file   Relationships    Social connections     Talks on phone: Not on file     Gets together: Not on file     Attends [employer] service: Not on file     Active member of club or organization: Not on file     Attends meetings of clubs or organizations: Not on file     Relationship status: Not on file    Intimate partner violence     Fear of current or ex partner: Not on file     Emotionally abused: Not on file     Physically abused: Not on file     Forced sexual activity: Not on file   Other Topics Concern    Not on file   Social History Narrative    Not on file       CURRENT MEDICATIONS:     Current Outpatient Medications   Medication Sig Dispense Refill    ARIPiprazole (ABILIFY) 5 MG tablet Take 5 mg by mouth daily      gabapentin (NEURONTIN) 100 MG capsule Take 1 capsule by mouth 3 times daily as needed (numbness, tingling, nerve pain) for up to 10 days. 30 capsule 0    cyclobenzaprine (FLEXERIL) 10 MG tablet Take 1 tablet by mouth every 6 hours as needed for Muscle spasms 50 tablet 0    naproxen (NAPROSYN) 500 MG tablet Take 1 tablet by mouth 2 times daily (with meals) (Patient taking differently: Take 500 mg by mouth 2 times daily (with meals) prn) 28 tablet 0    Phentermine-Topiramate (QSYMIA) 15-92 MG CP24 Take by mouth every morning.        FLUoxetine (PROZAC) 40 MG capsule Take 80 capsules by mouth daily   2    clonazePAM (KLONOPIN) 1 MG tablet Take 1.5 tablets by mouth daily. .  2    JUNEL FE 1/20 1-20 MG-MCG per tablet       Cholecalciferol (VITAMIN D PO) Take by mouth      acetaminophen (TYLENOL) 500 MG tablet Take 2 tablets by mouth every 6 hours as needed for Pain (Patient not taking: Reported on 12/8/2020) 112 tablet 0    Liraglutide -Weight Management (SAXENDA SC) Inject 3 mg into the skin      cloNIDine (CATAPRES) 0.2 MG tablet       ARIPiprazole (ABILIFY) 2 MG tablet Take 2 mg by mouth daily       No current facility-administered medications for this visit. ALLERGIES:     Allergies   Allergen Reactions    Norco [Hydrocodone-Acetaminophen] Rash    Vancomycin Hives                                 REVIEW OF SYSTEMS: All Negative        All items selected indicate a positive finding. Those items not selected are negative.   Constitutional [] Weight loss/gain   [] Fatigue  [] Fever/Chills   HEENT [] Hearing Loss  [] Visual Disturbance  [] Tinnitus  [] Eye pain   Respiratory [] Shortness of Breath  [] Cough  [] Snoring   Cardiovascular [] Chest Pain  [] Palpitations  [] Lightheaded   GI [] Constipation  [] Diarrhea  [] Swallowing change  [] Nausea/vomiting    [] Urinary Frequency  [] Urinary Urgency   Musculoskeletal [] Neck pain  [] Back pain  [] Muscle pain  [] Restless legs   Dermatologic [] Skin changes   Neurologic [] Memory loss/confusion  [] Seizures  [] Trouble walking or imbalance  [] Dizziness  [] Sleep disturbance  [] Weakness  [] Numbness  [] Tremors  [] Speech Difficulty  [] Headaches  [] Light Sensitivity  [] Sound Sensitivity   Endocrinology []Excessive thirst  []Excessive hunger   Psychiatric [] Anxiety/Depression  [] Hallucination   Allergy/immunology []Hives/environmental allergies   Hematologic/lymph [] Abnormal bleeding  [] Abnormal bruising         PHYSICAL EXAMINATION:       Vitals:    12/08/20 0804   BP: 112/75   Pulse: 108   Temp: 96.8 °F (36 °C)         There were no abnormal results from my examination .                                                                                                    General Appearance:  Alert, cooperative, no signs of distress, appears stated age   Head:  Normocephalic, no signs of trauma   Eyes:  Conjunctiva/corneas clear;  eyelids intact   Ears:  Normal external ear and canals   Nose: Nares normal, mucosa normal, no drainage    Throat: Lips and tongue normal; teeth normal;  gums normal   Neck: Supple, intact flexion, extension and rotation;   trachea midline;  no adenopathy;   thyroid: not enlarged;   no carotid pulse abnormality   Back:   Symmetric, no curvature, ROM adequate   Lungs:   Respirations unlabored   Heart:  Regular rate and rhythm           Extremities: Extremities normal, no cyanosis, no edema   Pulses: Symmetric over head and neck   Skin: Skin color, texture normal, no rashes, no lesions                                     NEUROLOGIC EXAMINATION    Neurologic Exam  Mental status    Alert and oriented x 3; intact memory with no confusion, speech or language problems; no hallucinations or delusions  Fund of information appropriate for level of education    Cranial nerves    II - visual fields intact to confrontation bilaterally  III, IV, VI - extra-ocular muscles full: no pupillary defect; no ZHANG, no nystagmus, no ptosis   V - normal facial sensation                                                               VII - normal facial symmetry                                                             VIII - intact hearing                                                                             IX, X - symmetrical palate                                                                  XI - symmetrical shoulder shrug                                                       XII - tongue midline without atrophy or fasciculation      Motor function  Normal muscle bulk and tone; strength 5/5 on all 4 extremities, no pronator drift      Sensory of the information in the transcribed note. *      Scribe Attestation:     By signing my name below, I, Tramaine Chowdhury, attest that this documentation has been prepared under the direction and in the presence of Bibiana Kiran CNP.

## 2020-12-23 ENCOUNTER — TELEPHONE (OUTPATIENT)
Dept: ORTHOPEDIC SURGERY | Age: 30
End: 2020-12-23

## 2020-12-23 NOTE — TELEPHONE ENCOUNTER
from [place] [id number] called to inquire if patient has another appointment scheduled with Dr. Sarahi Fuentes in the Orthopedic Specialist Department.

## 2021-07-15 DIAGNOSIS — M25.561 RIGHT KNEE PAIN, UNSPECIFIED CHRONICITY: Primary | ICD-10-CM
